# Patient Record
Sex: FEMALE | Race: WHITE | NOT HISPANIC OR LATINO | Employment: OTHER | URBAN - METROPOLITAN AREA
[De-identification: names, ages, dates, MRNs, and addresses within clinical notes are randomized per-mention and may not be internally consistent; named-entity substitution may affect disease eponyms.]

---

## 2020-01-01 ENCOUNTER — APPOINTMENT (OUTPATIENT)
Dept: PHYSICAL THERAPY | Facility: CLINIC | Age: 85
DRG: 558 | End: 2020-01-01
Attending: HOSPITALIST
Payer: COMMERCIAL

## 2020-01-01 ENCOUNTER — HOSPITAL ENCOUNTER (INPATIENT)
Facility: CLINIC | Age: 85
LOS: 3 days | Discharge: NURSING, REHAB OR LONG-TERM CARE | DRG: 558 | End: 2020-08-20
Attending: EMERGENCY MEDICINE | Admitting: HOSPITALIST
Payer: COMMERCIAL

## 2020-01-01 ENCOUNTER — APPOINTMENT (OUTPATIENT)
Dept: OCCUPATIONAL THERAPY | Facility: CLINIC | Age: 85
DRG: 558 | End: 2020-01-01
Payer: COMMERCIAL

## 2020-01-01 ENCOUNTER — APPOINTMENT (OUTPATIENT)
Dept: GENERAL RADIOLOGY | Facility: CLINIC | Age: 85
DRG: 558 | End: 2020-01-01
Attending: EMERGENCY MEDICINE
Payer: COMMERCIAL

## 2020-01-01 ENCOUNTER — APPOINTMENT (OUTPATIENT)
Dept: CT IMAGING | Facility: CLINIC | Age: 85
DRG: 558 | End: 2020-01-01
Attending: EMERGENCY MEDICINE
Payer: COMMERCIAL

## 2020-01-01 VITALS
RESPIRATION RATE: 16 BRPM | BODY MASS INDEX: 18.42 KG/M2 | DIASTOLIC BLOOD PRESSURE: 59 MMHG | OXYGEN SATURATION: 94 % | HEART RATE: 78 BPM | WEIGHT: 100.09 LBS | TEMPERATURE: 97.5 F | SYSTOLIC BLOOD PRESSURE: 142 MMHG | HEIGHT: 62 IN

## 2020-01-01 DIAGNOSIS — R41.0 CONFUSION: ICD-10-CM

## 2020-01-01 DIAGNOSIS — I25.10 CORONARY ARTERY DISEASE INVOLVING NATIVE CORONARY ARTERY OF NATIVE HEART, ANGINA PRESENCE UNSPECIFIED: Primary | ICD-10-CM

## 2020-01-01 DIAGNOSIS — M62.82 NON-TRAUMATIC RHABDOMYOLYSIS: ICD-10-CM

## 2020-01-01 LAB
ALBUMIN SERPL-MCNC: 2.6 G/DL (ref 3.4–5)
ALBUMIN SERPL-MCNC: 3.5 G/DL (ref 3.4–5)
ALBUMIN UR-MCNC: 30 MG/DL
ALP SERPL-CCNC: 65 U/L (ref 40–150)
ALP SERPL-CCNC: 97 U/L (ref 40–150)
ALT SERPL W P-5'-P-CCNC: 34 U/L (ref 0–50)
ALT SERPL W P-5'-P-CCNC: 39 U/L (ref 0–50)
ANION GAP SERPL CALCULATED.3IONS-SCNC: 5 MMOL/L (ref 3–14)
ANION GAP SERPL CALCULATED.3IONS-SCNC: 6 MMOL/L (ref 3–14)
ANION GAP SERPL CALCULATED.3IONS-SCNC: 7 MMOL/L (ref 3–14)
APPEARANCE UR: CLEAR
AST SERPL W P-5'-P-CCNC: 103 U/L (ref 0–45)
AST SERPL W P-5'-P-CCNC: 78 U/L (ref 0–45)
BACTERIA #/AREA URNS HPF: ABNORMAL /HPF
BACTERIA SPEC CULT: NO GROWTH
BASOPHILS # BLD AUTO: 0 10E9/L (ref 0–0.2)
BASOPHILS NFR BLD AUTO: 0.1 %
BILIRUB DIRECT SERPL-MCNC: 0.2 MG/DL (ref 0–0.2)
BILIRUB SERPL-MCNC: 0.6 MG/DL (ref 0.2–1.3)
BILIRUB SERPL-MCNC: 1 MG/DL (ref 0.2–1.3)
BILIRUB UR QL STRIP: NEGATIVE
BUN SERPL-MCNC: 28 MG/DL (ref 7–30)
BUN SERPL-MCNC: 28 MG/DL (ref 7–30)
BUN SERPL-MCNC: 29 MG/DL (ref 7–30)
CALCIUM SERPL-MCNC: 7.5 MG/DL (ref 8.5–10.1)
CALCIUM SERPL-MCNC: 7.9 MG/DL (ref 8.5–10.1)
CALCIUM SERPL-MCNC: 8.8 MG/DL (ref 8.5–10.1)
CHLORIDE SERPL-SCNC: 105 MMOL/L (ref 94–109)
CHLORIDE SERPL-SCNC: 108 MMOL/L (ref 94–109)
CHLORIDE SERPL-SCNC: 97 MMOL/L (ref 94–109)
CK SERPL-CCNC: 1805 U/L (ref 30–225)
CK SERPL-CCNC: 3356 U/L (ref 30–225)
CO2 SERPL-SCNC: 22 MMOL/L (ref 20–32)
CO2 SERPL-SCNC: 24 MMOL/L (ref 20–32)
CO2 SERPL-SCNC: 26 MMOL/L (ref 20–32)
COLOR UR AUTO: YELLOW
CREAT SERPL-MCNC: 0.68 MG/DL (ref 0.52–1.04)
CREAT SERPL-MCNC: 0.76 MG/DL (ref 0.52–1.04)
CREAT SERPL-MCNC: 0.81 MG/DL (ref 0.52–1.04)
DIFFERENTIAL METHOD BLD: ABNORMAL
EOSINOPHIL # BLD AUTO: 0 10E9/L (ref 0–0.7)
EOSINOPHIL NFR BLD AUTO: 0 %
ERYTHROCYTE [DISTWIDTH] IN BLOOD BY AUTOMATED COUNT: 13.6 % (ref 10–15)
ERYTHROCYTE [DISTWIDTH] IN BLOOD BY AUTOMATED COUNT: 14 % (ref 10–15)
ERYTHROCYTE [DISTWIDTH] IN BLOOD BY AUTOMATED COUNT: 14.4 % (ref 10–15)
GFR SERPL CREATININE-BSD FRML MDRD: 60 ML/MIN/{1.73_M2}
GFR SERPL CREATININE-BSD FRML MDRD: 65 ML/MIN/{1.73_M2}
GFR SERPL CREATININE-BSD FRML MDRD: 73 ML/MIN/{1.73_M2}
GLUCOSE SERPL-MCNC: 139 MG/DL (ref 70–99)
GLUCOSE SERPL-MCNC: 189 MG/DL (ref 70–99)
GLUCOSE SERPL-MCNC: 88 MG/DL (ref 70–99)
GLUCOSE UR STRIP-MCNC: NEGATIVE MG/DL
HCT VFR BLD AUTO: 28.8 % (ref 35–47)
HCT VFR BLD AUTO: 31.6 % (ref 35–47)
HCT VFR BLD AUTO: 33.8 % (ref 35–47)
HGB BLD-MCNC: 10.3 G/DL (ref 11.7–15.7)
HGB BLD-MCNC: 11.6 G/DL (ref 11.7–15.7)
HGB BLD-MCNC: 9.9 G/DL (ref 11.7–15.7)
HGB UR QL STRIP: ABNORMAL
IMM GRANULOCYTES # BLD: 0 10E9/L (ref 0–0.4)
IMM GRANULOCYTES NFR BLD: 0.2 %
INTERPRETATION ECG - MUSE: NORMAL
KETONES UR STRIP-MCNC: 10 MG/DL
LEUKOCYTE ESTERASE UR QL STRIP: ABNORMAL
LYMPHOCYTES # BLD AUTO: 1.1 10E9/L (ref 0.8–5.3)
LYMPHOCYTES NFR BLD AUTO: 6.9 %
Lab: NORMAL
MCH RBC QN AUTO: 29.2 PG (ref 26.5–33)
MCH RBC QN AUTO: 29.8 PG (ref 26.5–33)
MCH RBC QN AUTO: 30.1 PG (ref 26.5–33)
MCHC RBC AUTO-ENTMCNC: 32.6 G/DL (ref 31.5–36.5)
MCHC RBC AUTO-ENTMCNC: 34.3 G/DL (ref 31.5–36.5)
MCHC RBC AUTO-ENTMCNC: 34.4 G/DL (ref 31.5–36.5)
MCV RBC AUTO: 87 FL (ref 78–100)
MCV RBC AUTO: 88 FL (ref 78–100)
MCV RBC AUTO: 90 FL (ref 78–100)
MONOCYTES # BLD AUTO: 1.1 10E9/L (ref 0–1.3)
MONOCYTES NFR BLD AUTO: 6.9 %
NEUTROPHILS # BLD AUTO: 13.8 10E9/L (ref 1.6–8.3)
NEUTROPHILS NFR BLD AUTO: 85.9 %
NITRATE UR QL: NEGATIVE
NRBC # BLD AUTO: 0 10*3/UL
NRBC BLD AUTO-RTO: 0 /100
PH UR STRIP: 6 PH (ref 5–7)
PLATELET # BLD AUTO: 272 10E9/L (ref 150–450)
PLATELET # BLD AUTO: 328 10E9/L (ref 150–450)
PLATELET # BLD AUTO: 348 10E9/L (ref 150–450)
POTASSIUM SERPL-SCNC: 3.4 MMOL/L (ref 3.4–5.3)
POTASSIUM SERPL-SCNC: 3.9 MMOL/L (ref 3.4–5.3)
POTASSIUM SERPL-SCNC: 4 MMOL/L (ref 3.4–5.3)
PROT SERPL-MCNC: 5.5 G/DL (ref 6.8–8.8)
PROT SERPL-MCNC: 7.1 G/DL (ref 6.8–8.8)
RBC # BLD AUTO: 3.32 10E12/L (ref 3.8–5.2)
RBC # BLD AUTO: 3.53 10E12/L (ref 3.8–5.2)
RBC # BLD AUTO: 3.86 10E12/L (ref 3.8–5.2)
RBC #/AREA URNS AUTO: 2 /HPF (ref 0–2)
SARS-COV-2 RNA SPEC QL NAA+PROBE: NOT DETECTED
SODIUM SERPL-SCNC: 130 MMOL/L (ref 133–144)
SODIUM SERPL-SCNC: 134 MMOL/L (ref 133–144)
SODIUM SERPL-SCNC: 136 MMOL/L (ref 133–144)
SOURCE: ABNORMAL
SP GR UR STRIP: 1.02 (ref 1–1.03)
SPECIMEN SOURCE: NORMAL
SPECIMEN SOURCE: NORMAL
SQUAMOUS #/AREA URNS AUTO: <1 /HPF (ref 0–1)
UROBILINOGEN UR STRIP-MCNC: NORMAL MG/DL (ref 0–2)
WBC # BLD AUTO: 12.5 10E9/L (ref 4–11)
WBC # BLD AUTO: 12.6 10E9/L (ref 4–11)
WBC # BLD AUTO: 16 10E9/L (ref 4–11)
WBC #/AREA URNS AUTO: 7 /HPF (ref 0–5)

## 2020-01-01 PROCEDURE — 99223 1ST HOSP IP/OBS HIGH 75: CPT | Mod: AI | Performed by: HOSPITALIST

## 2020-01-01 PROCEDURE — 73502 X-RAY EXAM HIP UNI 2-3 VIEWS: CPT

## 2020-01-01 PROCEDURE — 36415 COLL VENOUS BLD VENIPUNCTURE: CPT | Performed by: INTERNAL MEDICINE

## 2020-01-01 PROCEDURE — 80053 COMPREHEN METABOLIC PANEL: CPT | Performed by: EMERGENCY MEDICINE

## 2020-01-01 PROCEDURE — 25000128 H RX IP 250 OP 636: Performed by: HOSPITALIST

## 2020-01-01 PROCEDURE — 82550 ASSAY OF CK (CPK): CPT | Performed by: HOSPITALIST

## 2020-01-01 PROCEDURE — 87086 URINE CULTURE/COLONY COUNT: CPT | Performed by: EMERGENCY MEDICINE

## 2020-01-01 PROCEDURE — 97116 GAIT TRAINING THERAPY: CPT | Mod: GP | Performed by: PHYSICAL THERAPIST

## 2020-01-01 PROCEDURE — 80048 BASIC METABOLIC PNL TOTAL CA: CPT | Performed by: HOSPITALIST

## 2020-01-01 PROCEDURE — U0003 INFECTIOUS AGENT DETECTION BY NUCLEIC ACID (DNA OR RNA); SEVERE ACUTE RESPIRATORY SYNDROME CORONAVIRUS 2 (SARS-COV-2) (CORONAVIRUS DISEASE [COVID-19]), AMPLIFIED PROBE TECHNIQUE, MAKING USE OF HIGH THROUGHPUT TECHNOLOGIES AS DESCRIBED BY CMS-2020-01-R: HCPCS | Performed by: EMERGENCY MEDICINE

## 2020-01-01 PROCEDURE — 25000128 H RX IP 250 OP 636: Performed by: EMERGENCY MEDICINE

## 2020-01-01 PROCEDURE — 96361 HYDRATE IV INFUSION ADD-ON: CPT

## 2020-01-01 PROCEDURE — 70450 CT HEAD/BRAIN W/O DYE: CPT

## 2020-01-01 PROCEDURE — 25000132 ZZH RX MED GY IP 250 OP 250 PS 637: Performed by: INTERNAL MEDICINE

## 2020-01-01 PROCEDURE — 99232 SBSQ HOSP IP/OBS MODERATE 35: CPT | Performed by: INTERNAL MEDICINE

## 2020-01-01 PROCEDURE — 25000128 H RX IP 250 OP 636: Performed by: INTERNAL MEDICINE

## 2020-01-01 PROCEDURE — 71045 X-RAY EXAM CHEST 1 VIEW: CPT

## 2020-01-01 PROCEDURE — 97530 THERAPEUTIC ACTIVITIES: CPT | Mod: GP | Performed by: PHYSICAL THERAPIST

## 2020-01-01 PROCEDURE — 85027 COMPLETE CBC AUTOMATED: CPT | Performed by: INTERNAL MEDICINE

## 2020-01-01 PROCEDURE — 12000000 ZZH R&B MED SURG/OB

## 2020-01-01 PROCEDURE — G0463 HOSPITAL OUTPT CLINIC VISIT: HCPCS

## 2020-01-01 PROCEDURE — 25800030 ZZH RX IP 258 OP 636: Performed by: INTERNAL MEDICINE

## 2020-01-01 PROCEDURE — C9803 HOPD COVID-19 SPEC COLLECT: HCPCS

## 2020-01-01 PROCEDURE — 85027 COMPLETE CBC AUTOMATED: CPT | Performed by: HOSPITALIST

## 2020-01-01 PROCEDURE — 97530 THERAPEUTIC ACTIVITIES: CPT | Mod: GO

## 2020-01-01 PROCEDURE — 25800030 ZZH RX IP 258 OP 636: Performed by: HOSPITALIST

## 2020-01-01 PROCEDURE — 25000132 ZZH RX MED GY IP 250 OP 250 PS 637: Performed by: HOSPITALIST

## 2020-01-01 PROCEDURE — 80048 BASIC METABOLIC PNL TOTAL CA: CPT | Performed by: INTERNAL MEDICINE

## 2020-01-01 PROCEDURE — 96365 THER/PROPH/DIAG IV INF INIT: CPT

## 2020-01-01 PROCEDURE — 97161 PT EVAL LOW COMPLEX 20 MIN: CPT | Mod: GP | Performed by: PHYSICAL THERAPIST

## 2020-01-01 PROCEDURE — 80076 HEPATIC FUNCTION PANEL: CPT | Performed by: HOSPITALIST

## 2020-01-01 PROCEDURE — 99291 CRITICAL CARE FIRST HOUR: CPT | Mod: 25

## 2020-01-01 PROCEDURE — 99239 HOSP IP/OBS DSCHRG MGMT >30: CPT | Performed by: INTERNAL MEDICINE

## 2020-01-01 PROCEDURE — 36415 COLL VENOUS BLD VENIPUNCTURE: CPT | Performed by: HOSPITALIST

## 2020-01-01 PROCEDURE — 81001 URINALYSIS AUTO W/SCOPE: CPT | Performed by: EMERGENCY MEDICINE

## 2020-01-01 PROCEDURE — 82550 ASSAY OF CK (CPK): CPT | Performed by: EMERGENCY MEDICINE

## 2020-01-01 PROCEDURE — 97165 OT EVAL LOW COMPLEX 30 MIN: CPT | Mod: GO

## 2020-01-01 PROCEDURE — 93005 ELECTROCARDIOGRAM TRACING: CPT

## 2020-01-01 PROCEDURE — 25800030 ZZH RX IP 258 OP 636: Performed by: EMERGENCY MEDICINE

## 2020-01-01 PROCEDURE — 85025 COMPLETE CBC W/AUTO DIFF WBC: CPT | Performed by: EMERGENCY MEDICINE

## 2020-01-01 RX ORDER — CEFTRIAXONE 1 G/1
1 INJECTION, POWDER, FOR SOLUTION INTRAMUSCULAR; INTRAVENOUS EVERY 24 HOURS
Status: DISCONTINUED | OUTPATIENT
Start: 2020-01-01 | End: 2020-01-01

## 2020-01-01 RX ORDER — METOPROLOL TARTRATE 25 MG/1
25 TABLET, FILM COATED ORAL 2 TIMES DAILY
Qty: 60 TABLET | Refills: 0 | DISCHARGE
Start: 2020-01-01

## 2020-01-01 RX ORDER — NITROGLYCERIN 0.4 MG/1
TABLET SUBLINGUAL
Qty: 20 TABLET | Refills: 0 | DISCHARGE
Start: 2020-01-01

## 2020-01-01 RX ORDER — METOPROLOL TARTRATE 25 MG/1
25 TABLET, FILM COATED ORAL 2 TIMES DAILY
Status: DISCONTINUED | OUTPATIENT
Start: 2020-01-01 | End: 2020-01-01 | Stop reason: HOSPADM

## 2020-01-01 RX ORDER — FERROUS SULFATE 325(65) MG
325 TABLET ORAL 2 TIMES DAILY WITH MEALS
Status: DISCONTINUED | OUTPATIENT
Start: 2020-01-01 | End: 2020-01-01 | Stop reason: HOSPADM

## 2020-01-01 RX ORDER — ACETAMINOPHEN 325 MG/1
650 TABLET ORAL EVERY 4 HOURS PRN
Status: DISCONTINUED | OUTPATIENT
Start: 2020-01-01 | End: 2020-01-01 | Stop reason: HOSPADM

## 2020-01-01 RX ORDER — SODIUM CHLORIDE 9 MG/ML
INJECTION, SOLUTION INTRAVENOUS CONTINUOUS
Status: DISCONTINUED | OUTPATIENT
Start: 2020-01-01 | End: 2020-01-01

## 2020-01-01 RX ORDER — HYDRALAZINE HYDROCHLORIDE 20 MG/ML
10 INJECTION INTRAMUSCULAR; INTRAVENOUS EVERY 4 HOURS PRN
Status: DISCONTINUED | OUTPATIENT
Start: 2020-01-01 | End: 2020-01-01 | Stop reason: HOSPADM

## 2020-01-01 RX ORDER — NALOXONE HYDROCHLORIDE 0.4 MG/ML
.1-.4 INJECTION, SOLUTION INTRAMUSCULAR; INTRAVENOUS; SUBCUTANEOUS
Status: DISCONTINUED | OUTPATIENT
Start: 2020-01-01 | End: 2020-01-01 | Stop reason: HOSPADM

## 2020-01-01 RX ORDER — FERROUS SULFATE 325(65) MG
325 TABLET ORAL 2 TIMES DAILY WITH MEALS
Qty: 60 TABLET | Refills: 0 | DISCHARGE
Start: 2020-01-01

## 2020-01-01 RX ORDER — NITROGLYCERIN 0.4 MG/1
0.4 TABLET SUBLINGUAL EVERY 5 MIN PRN
Status: DISCONTINUED | OUTPATIENT
Start: 2020-01-01 | End: 2020-01-01 | Stop reason: HOSPADM

## 2020-01-01 RX ORDER — CEFTRIAXONE 1 G/1
1 INJECTION, POWDER, FOR SOLUTION INTRAMUSCULAR; INTRAVENOUS ONCE
Status: COMPLETED | OUTPATIENT
Start: 2020-01-01 | End: 2020-01-01

## 2020-01-01 RX ORDER — ASPIRIN 81 MG/1
81 TABLET ORAL DAILY
Status: DISCONTINUED | OUTPATIENT
Start: 2020-01-01 | End: 2020-01-01 | Stop reason: HOSPADM

## 2020-01-01 RX ORDER — AMOXICILLIN 250 MG
1 CAPSULE ORAL 2 TIMES DAILY PRN
Status: DISCONTINUED | OUTPATIENT
Start: 2020-01-01 | End: 2020-01-01 | Stop reason: HOSPADM

## 2020-01-01 RX ORDER — LIDOCAINE 40 MG/G
CREAM TOPICAL
Status: DISCONTINUED | OUTPATIENT
Start: 2020-01-01 | End: 2020-01-01 | Stop reason: HOSPADM

## 2020-01-01 RX ORDER — FUROSEMIDE 10 MG/ML
20 INJECTION INTRAMUSCULAR; INTRAVENOUS ONCE
Status: COMPLETED | OUTPATIENT
Start: 2020-01-01 | End: 2020-01-01

## 2020-01-01 RX ORDER — ATORVASTATIN CALCIUM 20 MG/1
20 TABLET, FILM COATED ORAL EVERY EVENING
Qty: 20 TABLET | Refills: 0 | DISCHARGE
Start: 2020-01-01

## 2020-01-01 RX ORDER — LISINOPRIL 2.5 MG/1
2.5 TABLET ORAL DAILY
Qty: 30 TABLET | Refills: 0 | DISCHARGE
Start: 2020-01-01

## 2020-01-01 RX ORDER — LISINOPRIL 2.5 MG/1
2.5 TABLET ORAL DAILY
Status: DISCONTINUED | OUTPATIENT
Start: 2020-01-01 | End: 2020-01-01 | Stop reason: HOSPADM

## 2020-01-01 RX ORDER — ATORVASTATIN CALCIUM 10 MG/1
20 TABLET, FILM COATED ORAL EVERY EVENING
Status: DISCONTINUED | OUTPATIENT
Start: 2020-01-01 | End: 2020-01-01 | Stop reason: HOSPADM

## 2020-01-01 RX ORDER — AMOXICILLIN 250 MG
2 CAPSULE ORAL 2 TIMES DAILY PRN
Status: DISCONTINUED | OUTPATIENT
Start: 2020-01-01 | End: 2020-01-01 | Stop reason: HOSPADM

## 2020-01-01 RX ORDER — POLYETHYLENE GLYCOL 3350 17 G/17G
17 POWDER, FOR SOLUTION ORAL DAILY PRN
Status: DISCONTINUED | OUTPATIENT
Start: 2020-01-01 | End: 2020-01-01 | Stop reason: HOSPADM

## 2020-01-01 RX ADMIN — SODIUM CHLORIDE, PRESERVATIVE FREE: 5 INJECTION INTRAVENOUS at 08:27

## 2020-01-01 RX ADMIN — HYDRALAZINE HYDROCHLORIDE 10 MG: 20 INJECTION INTRAMUSCULAR; INTRAVENOUS at 05:38

## 2020-01-01 RX ADMIN — METOPROLOL TARTRATE 25 MG: 25 TABLET ORAL at 13:39

## 2020-01-01 RX ADMIN — CEFTRIAXONE SODIUM 1 G: 1 INJECTION, POWDER, FOR SOLUTION INTRAMUSCULAR; INTRAVENOUS at 21:05

## 2020-01-01 RX ADMIN — HYDRALAZINE HYDROCHLORIDE 10 MG: 20 INJECTION INTRAMUSCULAR; INTRAVENOUS at 22:39

## 2020-01-01 RX ADMIN — SODIUM CHLORIDE 1000 ML: 9 INJECTION, SOLUTION INTRAVENOUS at 19:44

## 2020-01-01 RX ADMIN — ASPIRIN 81 MG: 81 TABLET, COATED ORAL at 13:39

## 2020-01-01 RX ADMIN — FERROUS SULFATE TAB 325 MG (65 MG ELEMENTAL FE) 325 MG: 325 (65 FE) TAB at 13:40

## 2020-01-01 RX ADMIN — FUROSEMIDE 20 MG: 10 INJECTION, SOLUTION INTRAVENOUS at 12:53

## 2020-01-01 RX ADMIN — SODIUM CHLORIDE, PRESERVATIVE FREE: 5 INJECTION INTRAVENOUS at 22:38

## 2020-01-01 RX ADMIN — Medication 1 MG: at 22:56

## 2020-01-01 RX ADMIN — SODIUM CHLORIDE, PRESERVATIVE FREE: 5 INJECTION INTRAVENOUS at 23:49

## 2020-01-01 ASSESSMENT — MIFFLIN-ST. JEOR
SCORE: 788.6
SCORE: 774.6
SCORE: 793.6
SCORE: 809.6

## 2020-01-01 ASSESSMENT — ACTIVITIES OF DAILY LIVING (ADL)
ADLS_ACUITY_SCORE: 24
RETIRED_COMMUNICATION: 0-->UNDERSTANDS/COMMUNICATES WITHOUT DIFFICULTY
ADLS_ACUITY_SCORE: 25
ADLS_ACUITY_SCORE: 25
ADLS_ACUITY_SCORE: 23
ADLS_ACUITY_SCORE: 23
COGNITION: 1 - ATTENTION OR MEMORY DEFICITS
ADLS_ACUITY_SCORE: 21
ADLS_ACUITY_SCORE: 21
ADLS_ACUITY_SCORE: 23
ADLS_ACUITY_SCORE: 24
ADLS_ACUITY_SCORE: 23
ADLS_ACUITY_SCORE: 17
PREVIOUS_RESPONSIBILITIES: MEAL PREP;HOUSEKEEPING;LAUNDRY
SWALLOWING: 0-->SWALLOWS FOODS/LIQUIDS WITHOUT DIFFICULTY
ADLS_ACUITY_SCORE: 17
ADLS_ACUITY_SCORE: 25
ADLS_ACUITY_SCORE: 25
ADLS_ACUITY_SCORE: 23
ADLS_ACUITY_SCORE: 25
WHICH_OF_THE_ABOVE_FUNCTIONAL_RISKS_HAD_A_RECENT_ONSET_OR_CHANGE?: FALL HISTORY

## 2020-01-01 ASSESSMENT — ENCOUNTER SYMPTOMS
ARTHRALGIAS: 0
MYALGIAS: 0
WOUND: 1
COLOR CHANGE: 1
NECK PAIN: 0
ABDOMINAL PAIN: 0
HEADACHES: 0
BACK PAIN: 0

## 2020-08-17 PROBLEM — M62.82 RHABDOMYOLYSIS: Status: ACTIVE | Noted: 2020-01-01

## 2020-08-17 NOTE — ED PROVIDER NOTES
History   Chief Complaint:  Fall and Hypertension     HPI   Belen Paredes is a 89 year old female with history of NSTEMI, CHF, memory loss, hypertension, and hyperlipidemia who presents for evaluation after an unwitnessed fall. EMS reports, the patient lives alone in an apartment. They report a presumed unwitnessed fall and she was crawling around the apartment trying to find her phone to call for help. Her son had attempted to call her, but she did not answer so he called the building and maintenance found her on the ground, prompting EMS presentation. On EMS arrival, she was hypertensive and found on the ground with feces around the apartment. They also noted dried blood on the top of her head and right forearm bruising.     En route, the patient's blood sugar was 139 and they placed an 18 G IV in her right AC.     Here, the patient states she does not remember falling. She denies any headache, abdominal pain, neck pain, back pain, or other symptoms prompting her presentation.     Allergies:  No Known Drug Allergies     Medications:   Aspirin  Nitrostat  Lipitor  Lopressor    Past Medical History:    Hyperlipidemia  Chronic systolic heart failure  Anemia  Coronary atherosclerosis   Memory loss  NSTEMI  Hypertension  Congestive heart failure     Past Surgical History:    Total abdominal hysterectomy and bilateral salping-oophorectomy   Lobectomy of lung    Family History:    The patient denies any relevant family medical history.     Social History:  The patient was accompanied to the ED by EMS.  Smoking Status: Never Smoker  Smokeless Tobacco: Never Used  Alcohol Use: No  Drug Use: No    Review of Systems   Gastrointestinal: Negative for abdominal pain.   Musculoskeletal: Negative for arthralgias, back pain, myalgias and neck pain.   Skin: Positive for color change and wound.   Neurological: Negative for headaches.   All other systems reviewed and are negative.      Physical Exam     Patient Vitals for the  past 24 hrs:   BP Temp Temp src Pulse Heart Rate Resp SpO2   08/17/20 1945 (!) 164/64 97.6  F (36.4  C) Oral 84 86 10 97 %   08/17/20 1854 (!) 199/85 -- -- 67 67 16 94 %     Physical Exam  Constitutional: Alert, oriented to self and place. Pleasantly confused.   HENT:    Nose: Nose normal.    Mouth/Throat: Oropharynx is clear, mucous membranes are dry  Eyes: EOM are normal, anicteric, conjugate gaze  CV: regular rate and rhythm; no murmurs  Chest: Effort normal and breath sounds clear without wheezing or rales, symmetric bilaterally   GI:  non tender. No distension. No guarding or rebound.    MSK: No LE edema, no tenderness to palpation of BLE.  Neurological: Alert, Pleasantly confused, moving all extremities equally. No pronator drift. Cranial nerves intact.  Skin: Skin is warm and dry. Ecchymosis to right arm. Grade 2 pressure sore, right lateral hip. Grade 1 sacral pressure sore.   Emergency Department Course     ECG:  ECG taken at 1902, ECG read at 1910 by Neil Sandoval MD  Sinus tachycardia  Left ventricular hypertrophy with repolarization abnormality  Abnormal ECG  Rate 101 bpm. NJ interval 142. QRS duration 78. QT/QTc 346/448. P-R-T axes 61 81 54.      Imaging:  Radiology findings were communicated with the patient, family and Admitting MD who voiced understanding of the findings.    Head CT w/o Contrast  IMPRESSION:  1. Moderate cerebral atrophy.  2. Moderately extensive white matter changes which are most likely due  to chronic small vessel ischemic disease.  3. No evidence for intracranial hemorrhage or any acute process.  Reading per radiology.     XR Chest Port 1 View  IMPRESSION: No airspace consolidation, pneumothorax, or pleural  effusion. The aorta is elongated and calcified. Severe degenerative  changes of the left shoulder. Left shoulder is not well profiled and a  subcapital fracture cannot be excluded. Consider dedicated shoulder  films if there is shoulder pain.  Reading per radiology.      XR Pelvis and Hip Right 2 Views  IMPRESSION: No fracture or dislocation. Mild degenerative changes in both hips.  Reading per radiology.     Laboratory:  Laboratory findings were communicated with the patient, family and Admitting MD who voiced understanding of the findings.    CBC: WBC 16.0 (H), HGB 11.6 (L) o/w WNL ()  CMP:  (L), Glucose 139 (H), Ast 103 (H) o/w WNL (Creatinine 0.76)   CK total: 3356 (HH)    UA with Microscopic: Ketones 10 (A), Blood Trace (A), Protein albumin 30 (A), Leukocyte esterase Moderate (A), WBC/HPF 7 (H), Bacteria Few (A) o/w WNL   Urine Culture: Pending.     COVID-19 Virus (Coronavirus) by PCR: Pending.    Interventions:  1944 0.9% NaCl bolus 1000 mL IV   2105 Rocephin 1 g IV    Emergency Department Course:  Past medical records, nursing notes, and vitals reviewed.  EKG obtained in the ED, see results above.    The patient was sent for a Head CT w/o Contrast, XR Chest Port 1 View, XR Pelvis and Hip Right 2 Views while in the emergency department, results above.    IV was inserted and blood was drawn for laboratory testing, results above.   The patient provided a urine sample here in the emergency department. This was sent for laboratory testing, findings above.   A nasal swab was obtained for laboratory testing, findings above.      (1842)   I arrived in the patient's room in anticipation of the patient.     (1844)   EMS arrived with the patient and provided history.     (1846)   I performed an exam of the patient as documented above.     (2006)   I spoke with the patient's son, Guido, regarding his mother's presentation, findings, and plan of care.     (2036)   I spoke with Dr. Amato of the Hospitalist service regarding patient's presentation, findings, and plan of care.     Findings and plan explained to the Patient and son who consents to admission. Discussed the patient with Dr. Amato, who will admit the patient to a medicine bed for further monitoring, evaluation,  and treatment.  I personally reviewed the laboratory and imaging results with the Patient and son and answered all related questions prior to admission.     Impression & Plan   Covid-19  Belen Paredes was evaluated during a global COVID-19 pandemic, which necessitated consideration that the patient might be at risk for infection with the SARS-CoV-2 virus that causes COVID-19.   Applicable protocols for evaluation were followed during the patient's care.   COVID-19 was considered as part of the patient's evaluation. The plan for testing is:  a test was obtained during this visit.     Medical Decision Making:  Belen Paredes is a 89 year old female presenting for evaluation after she was found down in her condo after son did not hear from her and a  found her there. EMS reported scattered feces throughout the floor, suggesting she was moving around, but she did have pressure sores on her right buttock and bruising over her right forearm, however no focal bony tenderness. She is pleasantly confused with no focalizing neurologic deficits, CT imaging of her head was negative. Chest xray is clear. Right hip/pelvis xrays is negative. Labs were notable for mild leukocytosis, though I suspect this is likely secondary to stress demarginalization however UA is somewhat suggestive of a UTI, culture sent and will give a dose of ceftriaxone. CK was elevated at 33,000, however her creatinine is within normal limits. She was given 1 L IV fluids here, however further aggressive hydration was deferred given remote history of heart failure. I did discuss her presentation with her son, who is worried her memory has been declining gradually and there certainly could be a component of dementia. Will admit  to medicine for continued IV hydration for rhabdomyolysis, generalized weakness, and inability to care for herself.     Critical Care Time was 30 minutes for this patient excluding procedures.    Diagnosis:     ICD-10-CM    1. Non-traumatic rhabdomyolysis  M62.82    2. Confusion  R41.0      Disposition:  Admitted to a medicine bed under the care of Dr. Amato.     Neil Sandoval MD  [employer]  11:11 PM 08/17/20     Scribe Disclosure:  I, Lino Barry, am serving as a scribe at 6:48 PM on 8/17/2020 to document services personally performed by Neil Sandoval MD based on my observations and the provider's statements to me.  August 17, 2020   [facility] EMERGENCY DEPARTMENT       Neil Sandoval MD  08/17/20 8238

## 2020-08-17 NOTE — ED TRIAGE NOTES
Pt was found on the ground of her apartment with feces all over. Pt had an apparent fall at some point, son last heard from her more than a day ago. Pt has a sore on the left buttocks, dried blood on the head/neck.

## 2020-08-17 NOTE — ED NOTES
Bed: ST02  Expected date:   Expected time:   Means of arrival:   Comments:  German Reece found down hypotension 89 f

## 2020-08-18 NOTE — ED NOTES
[facility]  ED Nurse Handoff Report    ED Chief complaint: Fall and Hypertension      ED Diagnosis:   Final diagnoses:   None       Code Status: Full Code    Allergies: No Known Allergies    Patient Story: unknown fall  Focused Assessment:  Pt had unknown fall at unknown time. Son called maintenance to check on pt, pt was on the floor covered in feces. Pt seems confused, unsure if baseline.     Treatments and/or interventions provided: IVF  Patient's response to treatments and/or interventions: good    To be done/followed up on inpatient unit:  monitor    Does this patient have any cognitive concerns?: Short term memory loss    Activity level - Baseline/Home:  Independent  Activity Level - Current:   Stand with assist x2    Patient's Preferred language: Data Unavailable   Needed?: No    Isolation: None  Infection: Not Applicable  Bariatric?: No    Vital Signs:   Vitals:    08/17/20 1854 08/17/20 1945   BP: (!) 199/85    Pulse: 67    Resp: 16    Temp:  97.6  F (36.4  C)   TempSrc:  Oral   SpO2: 94%        Cardiac Rhythm:     Was the PSS-3 completed:   Yes  What interventions are required if any?               Family Comments: none here  OBS brochure/video discussed/provided to patient/family: N/A              Name of person given brochure if not patient: NA              Relationship to patient: NA    For the majority of the shift this patient's behavior was Green.   Behavioral interventions performed were none.    ED NURSE PHONE NUMBER: *[phone]

## 2020-08-18 NOTE — PLAN OF CARE
Alert and oriented x1 (self). Vital signs stable on room air. Assist of 2 + gait belt and walker; turn and repo q2h. Regular diet. Lung sounds diminished. Bowel sounds active, + flatus, BM today (found covered in stool); incontinent of stool. Burk patent with adequate urine output. Abrasion to left posterior head - scant serosanguinous drainage. Denies pain and nausea.

## 2020-08-18 NOTE — PLAN OF CARE
Discharge Planner PT   Patient plan for discharge: Home  Current status: Evaluation completed, treatment initiated. 89 yo female found down in condo. Found to be with onset of rhabdomyelitis and hyponatremia. Pt at baseline is [place], wears hearing aids. Uses 4[employer] for longer distances. Independent in her condo with mobility and basic ADLs. Son provides groceries and transportation for appointments.  Pt presents confused, adamant that collusion is occurring amongst staff. Follows 75% of one-step commands for mobility with constant redirection. Pt is impulsive. Sup>sit Astrid, sit>stand Astrid. Ambulated 150' with 4WW Astrid for balance and navigation. Up in chair, sitter and son at bedside upon PT exit.  Barriers to return to prior living situation: Decreased balance, generalized weakness, confusion, decreased command following, impaired safety awareness, fall risk.  Recommendations for discharge: TCU  Rationale for recommendations: Pt will benefit from continued skilled PT to improve independence and safety with functional mobility.       Entered by: Sakshi An 08/18/2020 3:11 PM

## 2020-08-18 NOTE — PLAN OF CARE
DATE & TIME: 8/17 from 2130 to 0730    Cognitive Concerns/ Orientation : Alert to self only. [place], other wise confused.   BEHAVIOR & AGGRESSION TOOL COLOR: Green, confused  CIWA SCORE: N/A   ABNL VS/O2: VSS on RA except hypertensive 180/77, given PRN Hydralazine  Latest B/P 156/68  MOBILITY: Assist, x 2.did not get out of bed, PT/OT consult pending.  PAIN MANAGMENT: Denied  DIET: Regular  BOWEL/BLADDER: did not void, bladder scanned with reading of 160 ml.   ABNL LAB/BG: Na 130, CK total 3,356, , , WBC 16, Hgb 11.6  DRAIN/DEVICES: NS infusing at 100 mL/hr  TELEMETRY RHYTHM: N/A  SKIN: Abrasion on the top of head, MD is aware. Right forearm bruising, Blanchable redness ox coccyx area, Meplex dressing applied. Pressure sore on right lateral of right hip.    TESTS/PROCEDURES: CT scan of head done  D/C DAY/GOALS/PLACE: Expected discharge 2-3 days pending in progress for clinical improvement  OTHER IMPORTANT INFO: PT/OT and WOC consult pending.   MD/RN ROUNDING SIGNED OFF D/E SHIFT: N/A  COMMIT TO SIT DONE AND SIGNED OFF Yes

## 2020-08-18 NOTE — PROGRESS NOTES
RECEIVING UNIT ED HANDOFF REVIEW    ED Nurse Handoff Report was reviewed by: Leander Asif RN on August 17, 2020 at 9:10 PM

## 2020-08-18 NOTE — PROGRESS NOTES
[facility]    Hospitalist Progress Note    Date of Service (when I saw the patient): 08/18/2020    Assessment & Plan   Belen Paredes is a 89 year old female who was admitted on 8/17/2020.  Assessment & Plan     Belen Paredes is a 89 year old female who presents with fall, found to have rhabdomyolysis and abnormal UA     Rhabdomyolysis  Fall, unwitnessed  Prolonged time on the ground unable to get up. CK 3356. Cr 0.76. AST mild elevated at 103. Na 130.  - IVF @100ml/hr--monitor respiratory status given hx chf  - CPK improved with hydration to 1805 today  Patient has not voided yet due to urinary retention bladder scan has 600 ml.  Burk placed today due to retention  - PT/OT/Care transitions consult--unlikely safe to go home  Hyponatremia  Na 130. Likely secondary to decreased intake since has been down on the ground  Sodium improved to 134 today with IV fluid hydration     Abnormal UA  UA with moderate leuk esterase, 7 WBCs. Empirically given ceftriaxone in ED.  - Follow up urine culture  Urine culture is negative so we will stop IV ceftriaxone today     Leukocytosis  Most likely due to dehydration WBC count is improving with hydration from 16 K yesterday to 12.6 today  Anemia  Hx of. Hgb 11.6 on admit, no signs of bleeding. Hx hgb 7-9 range in 2014.  Hemoglobin at 9.9 today continue to monitor closely     Hx Systolic CHF  TTE 9/2014 wth EF 30-35% with grade 1 diastolic dysfunction.  - Monitor fluid status carefully with ongoing IVF as above for rhabdo.  - daily weights  - I/Os     Right lateral hip pressure sore, present on admission  - WOC consulted     Hypertension  Known history high BP in the past, looks like she has been on lisinopril 5mg ~4 years ago, unsure if she is still on this medication. Likely that she missed doses if she was. BPs 170s-190s on admit  - await med rec  - hydralazine PRN for SBP>170  Blood pressure in the 150s today     Hyperlipidemia  Has been on statin in the  past  - Await med rec     Hard of hearing  Normally wears hearing aides, but does not have them  - Please have pocket talker available     Asymptomatic COVID-19 screening  Found down at home, no suspicious symptoms, but unreliable.  - Low suspicion. Add precautions if positive     DVT Prophylaxis: Pneumatic Compression Devices  Code Status: DNR/DNI ordered after discussion with son     Disposition: Expected discharge in 1-2days when stable . CPK normalizes     Paige Torres MD  [phone] (P)      Interval History   Resting comfortably in bed.  Denies any pain.  Does not want to be bothered much.      -Data reviewed today: I reviewed all new labs and imaging results over the last 24 hours. I personally reviewed no images or EKG's today.    Physical Exam   Temp: (P) 99.8  F (37.7  C) Temp src: (P) Axillary BP: (P) 125/65 Pulse: (P) 79 Heart Rate: 72 Resp: 16 SpO2: (P) 96 % O2 Device: (P) None (Room air)    Vitals:    08/17/20 2218 08/18/20 0428   Weight: 44.9 kg (98 lb 15.8 oz) 43.5 kg (95 lb 14.4 oz)     Vital Signs with Ranges  Temp:  [97.6  F (36.4  C)-99.8  F (37.7  C)] (P) 99.8  F (37.7  C)  Pulse:  [67-84] (P) 79  Heart Rate:  [67-86] 72  Resp:  [10-16] 16  BP: (156-199)/(64-85) (P) 125/65  SpO2:  [94 %-98 %] (P) 96 %  No intake/output data recorded.    Constitutional: Awake, alert, cooperative, no apparent distress, elderly female lying comfortably in bed and resting  Respiratory: Clear to auscultation bilaterally, no crackles or wheezing  Cardiovascular: Regular rate and rhythm, normal S1 and S2, and no murmur noted  GI: Normal bowel sounds, soft, non-distended, non-tender  Skin/Integumen: No rashes, no cyanosis, no edema  Other:     Medications     sodium chloride 100 mL/hr at 08/18/20 0827       cefTRIAXone  1 g Intravenous Q24H     sodium chloride (PF)  3 mL Intracatheter Q8H       Data   Recent Labs   Lab 08/18/20  0906 08/17/20  1858   WBC 12.6* 16.0*   HGB 9.9* 11.6*   MCV 87 88    348     130*   POTASSIUM 3.9 4.0   CHLORIDE 105 97   CO2 24 26   BUN 29 28   CR 0.68 0.76   ANIONGAP 5 7   JENNY 7.5* 8.8   GLC 88 139*   ALBUMIN 2.6* 3.5   PROTTOTAL 5.5* 7.1   BILITOTAL 0.6 1.0   ALKPHOS 65 97   ALT 34 39   AST 78* 103*       Recent Results (from the past 24 hour(s))   Head CT w/o contrast    Narrative    CT SCAN OF THE HEAD WITHOUT CONTRAST   8/17/2020 7:19 PM     HISTORY: Altered mental status, unexplained. Fall.    TECHNIQUE:  Axial images of the head and coronal reformations without  IV contrast material.  Radiation dose for this scan was reduced using  automated exposure control, adjustment of the mA and/or kV according  to patient size, or iterative reconstruction technique.    COMPARISON: None.    FINDINGS: Moderate cerebral atrophy is present. Moderately extensive  white matter changes are seen in both hemispheres without mass effect.  There is no evidence for intracranial hemorrhage, mass effect, acute  infarct, or skull fracture. Visualized paranasal sinuses and mastoid  air cells are clear.      Impression    IMPRESSION:  1. Moderate cerebral atrophy.  2. Moderately extensive white matter changes which are most likely due  to chronic small vessel ischemic disease.  3. No evidence for intracranial hemorrhage or any acute process.      ALLEY RIBERA MD   XR Chest 1 View    Narrative    CHEST ONE VIEW  8/17/2020 7:35 PM     HISTORY: Fall    COMPARISON: None.      Impression    IMPRESSION: No airspace consolidation, pneumothorax, or pleural  effusion. The aorta is elongated and calcified. Severe degenerative  changes of the left shoulder. Left shoulder is not well profiled and a  subcapital fracture cannot be excluded. Consider dedicated shoulder  films if there is shoulder pain.    KEY HAWKINS MD   XR Pelvis w Hip Right 1 View    Narrative    EXAM: XR PELVIS AND HIP RIGHT 1 VIEW  LOCATION: [facility]  DATE/TIME: 8/17/2020 7:17 PM    INDICATION: Right hip pain. Possible  fall.  COMPARISON: None.      Impression    IMPRESSION: No fracture or dislocation. Mild degenerative changes in both hips.

## 2020-08-18 NOTE — PROGRESS NOTES
08/18/20 1434   Quick Adds   Type of Visit Initial PT Evaluation   Living Environment   Lives With alone   Living Arrangements condominium   Home Accessibility no concerns   Transportation Anticipated family or friend will provide   Living Environment Comment Son brings groceries. Son reports he checks in daily with phone call.   Self-Care   Usual Activity Tolerance fair   Current Activity Tolerance fair   Activity/Exercise/Self-Care Comment Per son, pt uses 4WW for longer distances. Nothing in condo.   Functional Level Prior   Ambulation 1-->assistive equipment   Transferring 0-->independent   Toileting 0-->independent   Fall history within last six months yes   Number of times patient has fallen within last six months 1   Which of the above functional risks had a recent onset or change? cognition;fall history  (Per son, pt's cognition is not currently baseline.)   Prior Functional Level Comment Pt is not good historian. Son present to answer some questions. Also engaged with nursing on pt's medical status.   General Information   Onset of Illness/Injury or Date of Surgery - Date 08/17/20   Referring Physician Racheal Amato, DO    Pertinent History of Current Problem (include personal factors and/or comorbidities that impact the POC) 89 yo female found down in her condo. Onset of rhabdomyolitis and hyponatremia. Pt [place] at baseline and has HTN.   Precautions/Limitations fall precautions   General Observations Sitter at bedside. Pt with nonsensical conversation. Wearing a pocket talker.   General Info Comments Son brought hearing aids. They are charging at bedside.   Cognitive Status Examination   Orientation person   Level of Consciousness alert;confused;agitated   Follows Commands and Answers Questions 75% of the time;able to follow single-step instructions  (with redirection)   Personal Safety and Judgment impulsive   Cognitive Comment Son reported pt not at baseline. Pt thinks people are conspiring  "against her. Provided reassurance to the pt throughout.   Integumentary/Edema   Integumentary/Edema Comments Pt with significant bruising B arms. Small laceration on back of head.   Posture    Posture Forward head position;Protracted shoulders;Kyphosis   Range of Motion (ROM)   ROM Comment B UEs and LEs within functional limits   Strength   Strength Comments Demonstrates antigravity strength.    Bed Mobility   Bed Mobility Comments Supine>sit CGA   Transfer Skills   Transfer Comments Sit>stand Astrid   Gait   Gait Comments Bedside gait Astrid for balance. Pt reaches for furniture.   Balance   Balance Comments Posterior lean in sitting. Sitting balance fair. Standing balance fair. Improved with UE support.   Muscle Tone   Muscle Tone Comments Pt is thin and frail in appearance. Muscle atrophy likely disuse and age related.   General Therapy Interventions   Planned Therapy Interventions balance training;bed mobility training;gait training;neuromuscular re-education;ROM;strengthening;stretching;transfer training;progressive activity/exercise   Clinical Impression   Criteria for Skilled Therapeutic Intervention yes, treatment indicated   PT Diagnosis Impaired gait   Influenced by the following impairments Impaired balance, generalized weakness, confusion, impulsivity, lacks insight to deficits, fall risk   Functional limitations due to impairments Decreased functional independence   Clinical Presentation Stable/Uncomplicated   Clinical Presentation Rationale See MR. Clinical judgement used.   Clinical Decision Making (Complexity) Low complexity   Therapy Frequency 5x/week   Predicted Duration of Therapy Intervention (days/wks) 1 week   Anticipated Discharge Disposition Transitional Care Facility   Risk & Benefits of therapy have been explained Yes   Patient, Family & other staff in agreement with plan of care Yes   [facility] AM-PAC  \"6 Clicks\" V.2 Basic Mobility Inpatient Short Form   1. Turning from your back to " your side while in a flat bed without using bedrails? 3 - A Little   2. Moving from lying on your back to sitting on the side of a flat bed without using bedrails? 3 - A Little   3. Moving to and from a bed to a chair (including a wheelchair)? 3 - A Little   4. Standing up from a chair using your arms (e.g., wheelchair, or bedside chair)? 3 - A Little   5. To walk in hospital room? 3 - A Little   6. Climbing 3-5 steps with a railing? 3 - A Little   Basic Mobility Raw Score (Score out of 24.Lower scores equate to lower levels of function) 18   Total Evaluation Time   Total Evaluation Time (Minutes) 10

## 2020-08-18 NOTE — PROGRESS NOTES
"[facility] Nurse Inpatient Wound Assessment   Reason for consultation: Evaluate and treat  Right Hip wounds    Assessment  Right Hip wounds due to Pressure Injury   Status: initial assessment  Pt was found down at home for unknown period of time. Ruptured blister with surrounding red erythema. Pt with significant bruising to RUE.   Treatment Plan  Right Hip wounds: Every other day   1. Clean wound with saline or MicroKlenz Spray, pat dry  2. Wipe / \"clean\" the surrounding periwound tissue with several skin preps to help with dressing sticking  3. Apply A piece of Adaptic (#243-floor stock) to wound bed  4. Cover with Mepilex  Border Dressing (#752754) to the area, making sure to conform nicely to skin curvatures.   5. Time and date dressing change  -REPOSITION ALL PATIENTS SIDE TO SIDE ONLY WHEN IN BED, EVERY 2 HOURS,   -HEELS MUST BE KEPT ELEVATED AT ALL TIMES, USING AT LEAST 2 PILLOWS UNDER EACH CALF, ASSURING HEELS ARE FLOATING  -WHEN UP TO THE CHAIR PT NEEDS TO FULLY OFF LOAD EVERY 2 HOURS      Pressure Injury Prevention (PIP) Plan:  If patient is declining pressure injury prevention interventions: Explore reason why and address patient's concerns, Educate on pressure injury risk and prevention intervention(s) and If patient is still declining, document \"informed refusal\"   Mattress: Follow bed algorithm, reassess daily and order specialty mattress, if indicated.  HOB: Maintain at or below 30 degrees, unless contraindicated  Repositioning in bed: Every 1-2 hours , Left/right positioning; avoid supine and Raise foot of bed prior to raising head of bed, to reduce patient sliding down (shear)  Heels: Keep elevated off mattress and Pillows under calves  Protective Dressing: None  Positioning Equipment: None  Chair positioning: Assist patient to reposition hourly   If patient has a buttock pressure injury, or high risk for PI use chair cushion or SPS.  Moisture Management: Perineal cleansing /protection: Follow Incontinence " Protocol, Avoid brief in bed and Clean and dry skin folds with bathing   Under Devices: Inspect skin under all medical devices during skin inspection , Ensure tubes are stabilized without tension and Ensure patient is not lying on medical devices or equipment when repositioned  Ask provider to discontinue device when no longer needed.        Orders Written  Recommended provider order: None, at this time  WOC Nurse follow-up plan:weekly  Nursing to notify the Provider(s) and re-consult the WOC Nurse if wound(s) deteriorates or new skin concern.    Patient History  According to provider note(s):  Belen Paredes is a 89 year old female who presents with fall, found to have rhabdomyolysis and abnormal UA    Objective Data  Containment of urine/stool: Incontinence Protocol    Active Diet Order  Orders Placed This Encounter      Combination Diet Regular Diet Adult      Output:   No intake/output data recorded.    Risk Assessment:   Sensory Perception: 3-->slightly limited  Moisture: 3-->occasionally moist  Activity: 2-->chairfast  Mobility: 2-->very limited  Nutrition: 2-->probably inadequate  Friction and Shear: 2-->potential problem  Mike Score: 14                          Labs:   Recent Labs   Lab 08/17/20  1858   ALBUMIN 3.5   HGB 11.6*   WBC 16.0*       Physical Exam  Areas of skin assessed: focused Sacrum, coccyx, BL hips.    Wound Location:  Right Hip        Date of last photo 8/18/20  Wound History: Pt found down at home for unknown period of time, ruptured blister    Wound Base: 100 % superficial, pink moist, dermis     Palpation of the wound bed: normal      Drainage: small     Description of drainage: serosanguinous     Measurements (length x width x depth, in cm) 6.5  x 5  x  0.1 cm      Tunneling N/A     Undermining N/A  Periwound skin: intact and erythema- blanchable, erythema extends approx 3-5cm around wound       Color: red      Temperature: normal   Odor: none  Pain: absent, none  Pain intervention  prior to dressing change: NA    Interventions  Visual inspection and assessment completed   Wound Care Rationale Promote moist wound healing without tissue dehydration  and Provide protection   Wound Care: done per plan of care  Supplies: at bedside  Current off-loading measures: Pillows under calves  Current support surface: Standard  Atmos Air mattress, pulsate ordered  Education provided to: importance of repositioning, plan of care, wound progress and Off-loading pressure  Discussed plan of care with Nurse    Demar Amaro RN  CWOCN

## 2020-08-18 NOTE — PLAN OF CARE
"OT: attempted to see patient for OT evaluation, patient asleep, when awoken patient appeared confused, inconsistently following commands. Patient resistive to therapy and refused to get out of bed or move this morning,\"I'm in a cold house, leave!\"  "

## 2020-08-18 NOTE — H&P
[facility]    History and Physical  Hospitalist       Date of Admission:  8/17/2020    Assessment & Plan   Belen Paredes is a 89 year old female who presents with fall, found to have rhabdomyolysis and abnormal UA    Rhabdomyolysis  Fall, unwitnessed  Prolonged time on the ground unable to get up. CK 3356. Cr 0.76. AST mild elevated at 103. Na 130.  - Admit inpatient  - IVF @100ml/hr--monitor respiratory status given hx chf  - CK in AM  - PT/OT/Care transitions consult--unlikely safe to go home  - Attempted phone call to son to collect additional information, but no answer and mail box not set-up yet.  -- son is primary contact listed on paper that came with her on transfer.    Hyponatremia  Na 130. Likely secondary to decreased intake since has been down on the ground  - IVF as above  - BMP in AM    Abnormal UA  UA with moderate leuk esterase, 7 WBCs. Empirically given ceftriaxone in ED.  - Follow up urine culture  - continue ceftriaxone for now    Leukocytosis  Could be secondary to stress demargination with prolonged time down. Given abnormal UA as above, was started on antibiotics  - CBC in AM    Anemia  Hx of. Hgb 11.6 on admit, no signs of bleeding. Hx hgb 7-9 range in 2014.  - CBC in AM    Hx Systolic CHF  TTE 9/2014 wth EF 30-35% with grade 1 diastolic dysfunction.  - Monitor fluid status carefully with ongoing IVF as above for rhabdo.  - daily weights  - I/Os    Right lateral hip pressure sore, present on admission  - WOC consulted    Hypertension  Known history high BP in the past, looks like she has been on lisinopril 5mg ~4 years ago, unsure if she is still on this medication. Likely that she missed doses if she was. BPs 170s-190s on admit  - await med rec  - hydralazine PRN for SBP>170    Hyperlipidemia  Has been on statin in the past  - Await med rec    Hard of hearing  Normally wears hearing aides, but does not have them  - Please have pocket talker available    Asymptomatic  COVID-19 screening  Found down at home, no suspicious symptoms, but unreliable.  - Low suspicion. Add precautions if positive    DVT Prophylaxis: Pneumatic Compression Devices  Code Status: Full Code, until can discuss with patient's son  Expected discharge: 2-3 days, recommended to likely TCU or some other environment with increased support pending therapy varun Amato DO    Primary Care Physician   [facility]    Chief Complaint   Fall, found down    History is obtained from the ED provider    History of Present Illness   Belen Paredes is a 89 year old female who presented with EMS after being found down by building maintenance (who were called when son could not reach her). She was found crawling around her apartment to try to call her son. She was covered in feces. Unknown time that she was unable to get up. She is unable to provide further history or tell me what happened. She is not clearly oriented, but states that her son always chides her when she doesn't know the date.  She does not remember falling. Denies diarrhea, fevers, chills, urinary pain, abdominal pain, chest pain, shortness of breath. She is happy to be in the hospital and states she thinks she needs some assistance.    Past Medical History    I have reviewed this patient's medical history and updated it with pertinent information if needed.   Hyperlipidemia  Chronic systolic heart failure  Anemia  Coronary atherosclerosis   Memory loss  NSTEMI  Hypertension  Congestive heart failure     Past Surgical History   I have reviewed this patient's surgical history and updated it with pertinent information if needed.  Total abdominal hysterectomy and bilateral salping-oophorectomy   Lobectomy of lung    Prior to Admission Medications   None   She might be on lisinopril 5mg, asa 81mg, statin and zantac--but this was from 2016 records. She is unable to provide history, son not available by phone.    Allergies   No Known  Allergies    Social History   I have reviewed this patient's social history and updated it with pertinent information if needed. She is unable to provide history regarding smoking/alcohol use.    Family History   I have reviewed this patient's family history and updated it with pertinent information if needed. Unable to obtain secondary to mental status    Review of Systems   The 10 point Review of Systems is negative other than noted in the HPI, limited due to confusion and hearing.    Physical Exam   Temp: 98.4  F (36.9  C) Temp src: Oral BP: (!) 180/77 Pulse: 84 Heart Rate: 85 Resp: 16 SpO2: 98 % O2 Device: None (Room air)    Vital Signs with Ranges  Temp:  [97.6  F (36.4  C)-98.4  F (36.9  C)] 98.4  F (36.9  C)  Pulse:  [67-84] 84  Heart Rate:  [67-86] 85  Resp:  [10-16] 16  BP: (164-199)/(64-85) 180/77  SpO2:  [94 %-98 %] 98 %  98 lbs 15.78 oz    Constitutional: Awake, alert, cooperative, no apparent distress.  Eyes: Conjunctiva and pupils examined and normal.  HEENT: Moist mucous membranes, normal dentition, hard of hearing  Respiratory: Clear to auscultation bilaterally, no crackles or wheezing.  Cardiovascular: Regular rate and rhythm, normal S1 and S2, and no murmur noted.  GI: Soft, non-distended, non-tender, normal bowel sounds.  Lymph/Hematologic: No anterior cervical or supraclavicular adenopathy.  Skin: No rashes, no cyanosis, no edema. Ecchymosis right arm, grade 2 pressure sore on right lateral hip.  Musculoskeletal: No joint swelling, erythema or tenderness.  Neurologic: moves all extremities, follows commands  Psychiatric: Alert, oriented to person, not to place, or time. no obvious anxiety or depression.    Data   Data reviewed today:  I personally reviewed CT moderate cerebral atrophy, moderately extensive white matter changes- likely chronic small vessel ischemic disease. CXR: degenerative changes of left shoulder--subcapital fracture not excluded. XR pelvis/hip: no fracture or dislocation, mild  degenerative changes both hips.  Recent Labs   Lab 08/17/20  1858   WBC 16.0*   HGB 11.6*   MCV 88      *   POTASSIUM 4.0   CHLORIDE 97   CO2 26   BUN 28   CR 0.76   ANIONGAP 7   JENNY 8.8   *   ALBUMIN 3.5   PROTTOTAL 7.1   BILITOTAL 1.0   ALKPHOS 97   ALT 39   *       Recent Results (from the past 24 hour(s))   Head CT w/o contrast    Narrative    CT SCAN OF THE HEAD WITHOUT CONTRAST   8/17/2020 7:19 PM     HISTORY: Altered mental status, unexplained. Fall.    TECHNIQUE:  Axial images of the head and coronal reformations without  IV contrast material.  Radiation dose for this scan was reduced using  automated exposure control, adjustment of the mA and/or kV according  to patient size, or iterative reconstruction technique.    COMPARISON: None.    FINDINGS: Moderate cerebral atrophy is present. Moderately extensive  white matter changes are seen in both hemispheres without mass effect.  There is no evidence for intracranial hemorrhage, mass effect, acute  infarct, or skull fracture. Visualized paranasal sinuses and mastoid  air cells are clear.      Impression    IMPRESSION:  1. Moderate cerebral atrophy.  2. Moderately extensive white matter changes which are most likely due  to chronic small vessel ischemic disease.  3. No evidence for intracranial hemorrhage or any acute process.      ALLEY RIBERA MD   XR Chest 1 View    Narrative    CHEST ONE VIEW  8/17/2020 7:35 PM     HISTORY: Fall    COMPARISON: None.      Impression    IMPRESSION: No airspace consolidation, pneumothorax, or pleural  effusion. The aorta is elongated and calcified. Severe degenerative  changes of the left shoulder. Left shoulder is not well profiled and a  subcapital fracture cannot be excluded. Consider dedicated shoulder  films if there is shoulder pain.    KEY HAWKINS MD   XR Pelvis w Hip Right 1 View    Narrative    EXAM: XR PELVIS AND HIP RIGHT 1 VIEW  LOCATION: [facility]  DATE/TIME: 8/17/2020  7:17 PM    INDICATION: Right hip pain. Possible fall.  COMPARISON: None.      Impression    IMPRESSION: No fracture or dislocation. Mild degenerative changes in both hips.

## 2020-08-19 NOTE — PROGRESS NOTES
[facility]    Hospitalist Progress Note    Date of Service (when I saw the patient): 08/19/2020    Assessment & Plan   Belen Paredes is a 89 year old female who was admitted on 8/17/2020.  Assessment & Plan     Belen Paredes is a 89 year old female who presents with fall, found to have rhabdomyolysis and abnormal UA     Rhabdomyolysis  Fall, unwitnessed  Prolonged time on the ground unable to get up. CK 3356. Cr 0.76. AST mild elevated at 103. Na 130.  - CPK improved with hydration to 1805 yesterday   Cr remains normal. She is having good urine out put.  urinary retention pedroza placed    Voiding trial today   D/horacio IVF today     - PT/OT/Care transitions consult--needs TCU on discharge     Hyponatremia  Na 130. Likely secondary to decreased intake since has been down on the ground  Sodium improved to 136 today with IV fluid hydration  D/horacio IVF today      Abnormal UA  UA with moderate leuk esterase, 7 WBCs. Empirically given ceftriaxone in ED.  - Follow up urine culture  Urine culture is negative so stopped IV ceftriaxone      Leukocytosis    Most likely due to dehydration WBC count is improving with hydration from 16 K yesterday to 12.6 today    Anemia  Hx of. Hgb 11.6 on admit, no signs of bleeding. Hx hgb 7-9 range in 2014.  Hemoglobin at 9.9 today continue to monitor closely     Hx Systolic CHF  TTE 9/2014 wth EF 30-35% with grade 1 diastolic dysfunction.  - Monitor fluid status carefully with ongoing IVF as above for rhabdo.  - daily weights  - I/Os  Will give 20mg lasix once today      Right lateral hip pressure sore, present on admission  - WOC consulted     Hypertension  Known history high BP in the past, looks like she has been on lisinopril 5mg ~4 years ago, unsure if she is still on this medication. Likely that she missed doses if she was. BPs 170s-190s on admit  - hydralazine PRN for SBP>170  Blood pressure in the 150s today     Hyperlipidemia  Has been on statin in the past  - Await  med rec     Hard of hearing  Normally wears hearing aides, but does not have them  - Please have pocket talker available     Asymptomatic COVID-19 screening ruled out   Found down at home, no suspicious symptoms       DVT Prophylaxis: Pneumatic Compression Devices  Code Status: DNR/DNI ordered after discussion with son     Disposition: Expected discharge tomorrow if TCU bed is available     Paige Torres MD  [phone] (P)      Interval History   Sitting comfortably in chair .  Denies any pain. No SOB or chest pain     -Data reviewed today: I reviewed all new labs and imaging results over the last 24 hours. I personally reviewed no images or EKG's today.    Physical Exam   Temp: 98.9  F (37.2  C) Temp src: Oral BP: (!) 150/59 Pulse: 76 RETIRE: Heart Rate: 85 Resp: 16 SpO2: 95 % O2 Device: None (Room air)    Vitals:    08/17/20 2218 08/18/20 0428 08/19/20 0618   Weight: 44.9 kg (98 lb 15.8 oz) 43.5 kg (95 lb 14.4 oz) 47 kg (103 lb 9.9 oz)     Vital Signs with Ranges  Temp:  [98  F (36.7  C)-98.9  F (37.2  C)] 98.9  F (37.2  C)  Pulse:  [76-84] 76  RETIRE: Heart Rate:  [85] 85  Resp:  [16] 16  BP: (126-150)/(46-64) 150/59  SpO2:  [95 %-98 %] 95 %  I/O last 3 completed shifts:  In: 2810 [P.O.:360; I.V.:2450]  Out: 925 [Urine:925]    Constitutional: Awake, alert, cooperative, no apparent distress, elderly female lying comfortably in bed and resting  Respiratory: Clear to auscultation bilaterally, no crackles or wheezing  Cardiovascular: Regular rate and rhythm, normal S1 and S2, and no murmur noted  GI: Normal bowel sounds, soft, non-distended, non-tender  Skin/Integumen: No rashes, no cyanosis, no edema  Other:     Medications       sodium chloride (PF)  3 mL Intracatheter Q8H       Data   Recent Labs   Lab 08/19/20  1015 08/18/20  0906 08/17/20  1858   WBC 12.5* 12.6* 16.0*   HGB 10.3* 9.9* 11.6*   MCV 90 87 88    272 348    134 130*   POTASSIUM 3.4 3.9 4.0   CHLORIDE 108 105 97   CO2 22 24 26   BUN 28  29 28   CR 0.81 0.68 0.76   ANIONGAP 6 5 7   JENNY 7.9* 7.5* 8.8   * 88 139*   ALBUMIN  --  2.6* 3.5   PROTTOTAL  --  5.5* 7.1   BILITOTAL  --  0.6 1.0   ALKPHOS  --  65 97   ALT  --  34 39   AST  --  78* 103*       No results found for this or any previous visit (from the past 24 hour(s)).

## 2020-08-19 NOTE — PLAN OF CARE
At baseline pt lives alone in a condo. Is independent in self-cares, laundry, light cleaning, cooking. Son assists with financial management, transportation, shopping. Pt reports she takes no meds.    Discharge Planner OT   Patient plan for discharge: TCU  Current status: Upon arrival pt ambulating in [place] with 4WW and staff member. Pt ambulated additional 300 ft with 4WW with CGA for balance safety. Pt stood at hospital room window to look out. She attempted to ambulate without walker and needed max cues and assist. Pt cued to sit in recliner and instead sat on 4WW seat; needed max cues and assist to lock brakes for safety. Impaired 2 step direction following throughout session. Pt had already completed grooming and dressing tasks. Pt with pedroza catheter. Son arrived. Pt and son participated in education regarding recommendation for TCU.   Barriers to return to prior living situation: fall risk; lives alone; cognitive impairment; impulsive; level of assist for ADLs and IADLs  Recommendations for discharge: TCU  Rationale for recommendations: Pt would be unsafe to return home at this time due to the above mentioned barriers. Pt would benefit from continued therapies at TCU setting to maximize independence, activity tolerance and safety with ADL, IADL, and mobility.         Entered by: Adriane Holley 08/19/2020 10:24 AM

## 2020-08-19 NOTE — CONSULTS
CLINICAL NUTRITION SERVICES  -  ASSESSMENT NOTE    Recommendations Ordered by Registered Dietitian (RD):   Regular diet per MD  Add vanilla milkshake + 2 pkts benepro BID between meals.    Malnutrition:   % Weight Loss:  No weight loss indicated - low weight at baseline  % Intake:  No decreased intake noted  Subcutaneous Fat Loss:  Consistent with age  Muscle Loss:  Consistent with age  Fluid Retention:  None noted    Malnutrition Diagnosis: Patient does not meet two of the above criteria necessary for diagnosing malnutrition     REASON FOR ASSESSMENT  Belen Paredes is a 89 year old female seen by Registered Dietitian for Admission Nutrition Risk Screen for stageable pressure injuries or large/non-healing wound or burn (new)     NUTRITION HISTORY  - Information obtained from patient and chart review. Patient's son walked in during visit as well.   - Patient is admitted after an unwitnessed fall, resulting in rhabdomyolysis after lying on the floor for several hours.  - PMH includes CHF, HTN, hyperlipidemia. She is also very [place]  (hearing aids).    - Pt comes from home alone, where her son will often stop over with groceries and for a meal.   - She eats really small meals, but states that her appetite is good. Per her son, she eats well but only small amounts at a time.   - She used to do Boost/Ensure and TV dinners but she was not really fond of either of these things.   - She likes eggs, bread, yogurt, ice cream.   - NKFA      CURRENT NUTRITION ORDERS  Diet Order:     Regular     Current Intake/Tolerance:  Belen states that her appetite is great, and she looks forward to having a baked potato for lunch. During visit she has an HB egg and fruit on tray table, states she is still planning to eat the egg. Agreed to ice cream w/ her lunch and to vanilla milkshakes BID between meals.     NUTRITION FOCUSED PHYSICAL ASSESSMENT FOR DIAGNOSING MALNUTRITION)  Yes           Observed:    Some increased muscle losses  "suspected with rhabodo, though pt otherwise appears appropriate for age    Obtained from Chart/Interdisciplinary Team:  Last BM 8/18  Mike - Nutrition 3; Total 17    WOCN assessment 8/18 - ruptured blister w/ surrounding red erythema.     ANTHROPOMETRICS  Height: 5' 2.4\"  Weight: 43.5 kg - lowest of admission   Body mass index is 17.3 kg/m .  Weight Status:  Underweight BMI <18.5  IBW: 51.1 kg  % IBW: 85%  Weight History:   Wt Readings from Last 10 Encounters:   08/19/20 47 kg (103 lb 9.9 oz)       LABS  Labs reviewed   (H)    MEDICATIONS  Medications reviewed  Lasix     ASSESSED NUTRITION NEEDS PER APPROVED PRACTICE GUIDELINES:  Dosing Weight 43.5 kg - lowest of admission   Estimated Energy Needs: 8190-9289 kcals (25-30 Kcal/Kg)  Justification: maintenance  Estimated Protein Needs: 52-65 grams protein (1.2-1.5 g pro/Kg)  Justification: wound healing and preservation of lean body mass  Estimated Fluid Needs: >1 mL/kcal   Justification: maintenance    MALNUTRITION:  % Weight Loss:  No weight loss indicated - low weight at baseline  % Intake:  No decreased intake noted  Subcutaneous Fat Loss:  Consistent with age  Muscle Loss:  Consistent with age  Fluid Retention:  None noted    Malnutrition Diagnosis: Patient does not meet two of the above criteria necessary for diagnosing malnutrition    NUTRITION DIAGNOSIS:  Predicted inadequate nutrient intake (kcal/protein) related to decreased appetite, reduced ability to eat adequate quantities at mealtimes, tendency to consume small meals.      NUTRITION INTERVENTIONS  Recommendations / Nutrition Prescription  Regular diet per MD  Add vanilla milkshake + 2 pkts benepro BID between meals.       Implementation  Nutrition education: Provided education on supplements available.   Medical Food Supplement: as above      Nutrition Goals  Intake of at least 75% meals TID + 1-2 supplements.       MONITORING AND EVALUATION:  Progress towards goals will be monitored and " evaluated per protocol and Practice Guidelines    Mariah Estrada RD, LD  Pager: [phone]

## 2020-08-19 NOTE — PLAN OF CARE
DATE & TIME: 8/18/2020; 1795-3995  Cognitive Concerns/ Orientation : A & O  x 1 (self only)   BEHAVIOR & AGGRESSION TOOL COLOR: Green   CIWA SCORE: N/A   ABNL VS/O2: VSS on RA   MOBILITY: x 2 Gb/walker  PAIN MANAGMENT: denies  DIET: Regular; fair appetite  BOWEL/BLADDER: BS active x 4; incontinent of bowel; pedroza in place  ABNL LAB/BG: Creatinine= 1,805; Albumin= 2.6; AST=78; WBC=12.6; Hgb=9.9   DRAIN/DEVICES: PIV infusing NS @ 75 mL/hr   TELEMETRY RHYTHM: N/A  SKIN: Scattered bruising; abrasion on left posterior head; R. Hip wound (foam/CDI)   TESTS/PROCEDURES: N/A  D/C DAY/GOALS/PLACE: pending discharge to TCU   OTHER IMPORTANT INFO: Sitter at the bedside     MD/RN ROUNDING SIGNED OFF D/E SHIFT: N/A  COMMIT TO SIT DONE AND SIGNED OFF COMPLETE

## 2020-08-19 NOTE — PROGRESS NOTES
DATE & TIME: 08/18/2020 Night    Cognitive Concerns/ Orientation : Alert to self only. Pleasant and cheerful   BEHAVIOR & AGGRESSION TOOL COLOR: green  CIWA SCORE: n/a   ABNL VS/O2: /64, otherwise VSS, room air  MOBILITY: Assist-2 gait belt, walker  PAIN MANAGMENT: Denies  DIET: Regular  BOWEL/BLADDER: Burk, incontinent of bowel  ABNL LAB/BG: AST 78, Creat Kinase 1805, WBC 12.6, Hgb 9.9, Hematocrit 28.8  DRAIN/DEVICES: L PIV infusing Normal Saline at 75 mL/hour  TELEMETRY RHYTHM: n/a  SKIN: Bruising, abrasion on L posterior head-scant drainage, R hip wound-foam dressing-clean, dry, intact  TESTS/PROCEDURES: CT of pelvis negative for fracture, Severe degeneration of L shoulder, CT of head negative for acute injury  D/C DAY/GOALS/PLACE: Pending, to TCU  OTHER IMPORTANT INFO: Sitter at bedside  MD/RN ROUNDING SIGNED OFF D/E SHIFT: n/a  COMMIT TO SIT DONE AND SIGNED OFF YES

## 2020-08-19 NOTE — CONSULTS
Both Care transitions and  orders placed.  Therapies are recommending a TCU.  Have conversed with rounding Hospitalist.  It is anticipated that pt will be ready for hospital discharge tomorrow.  Pt currently has a sitter.  She will need to be without a sitter for 24hrs in order to transition to a TCU.  SW is aware and will be working with pt/family on discharge planning.    Addendum: Conversed with pt's nurse.  Pt has improved.  Sitter was discontinued at 9:30 this AM.

## 2020-08-19 NOTE — PROGRESS NOTES
08/19/20 1030   Quick Adds   Type of Visit Initial Occupational Therapy Evaluation   Living Environment   Lives With alone   Living Arrangements condominium   Home Accessibility no concerns   Transportation Anticipated family or friend will provide   Living Environment Comment Son brings groceries. Son reports he checks in daily with phone call.   Self-Care   Usual Activity Tolerance good   Current Activity Tolerance moderate   Equipment Currently Used at Home walker, rolling   Activity/Exercise/Self-Care Comment Per son, pt uses 4WW for longer distances. Nothing in condo.   Functional Level   Ambulation 1-->assistive equipment  (for community mobility)   Transferring 0-->independent   Toileting 0-->independent   Bathing 0-->independent   Dressing 0-->independent   Eating 0-->independent   Fall history within last six months yes   Number of times patient has fallen within last six months 1   General Information   Onset of Illness/Injury or Date of Surgery - Date 08/17/20   Referring Physician Racheal Amato, DO   Patient/Family Goals Statement Improve independence   Additional Occupational Profile Info/Pertinent History of Current Problem 89 yo female found down in her condo. Onset of rhabdomyolitis and hyponatremia. Pt [place] at baseline and has HTN.   Precautions/Limitations fall precautions   Cognitive Status Examination   Orientation person;place;time   Level of Consciousness alert   Follows Commands (Cognition) follows two step commands;25-49% accuracy;increased processing time needed;repetition of directions required;verbal cues/prompting required   Memory impaired   Attention Selective attention impaired, difficulty ignoring irrelevant stimuli;Alternating attention impaired, difficulty shifting between tasks   Organization/Problem Solving Problem solving impaired   Executive Function Self awareness/monitoring impaired;Planning ability impaired   Visual Perception   Visual Perception   (Pt denies  "vision impairment)   Pain Assessment   Patient Currently in Pain Yes, see Vital Sign flowsheet   Strength   Strength Comments Generalized weakness throughout BUEs; reduced gross grasp strength bilaterally   Transfer Skill: Sit to Stand   Level of [place]: Sit/Stand contact guard   Transfer Skill: Toilet Transfer   Level of [place]: Toilet contact guard   Lower Body Dressing   Level of [place]: Dress Lower Body minimum assist (75% patients effort)   Grooming   Level of [place]: Grooming stand-by assist   Instrumental Activities of Daily Living (IADL)   Previous Responsibilities meal prep;housekeeping;laundry   Activities of Daily Living Analysis   Impairments Contributing to Impaired Activities of Daily Living balance impaired;cognition impaired;coordination impaired;pain;strength decreased   General Therapy Interventions   Planned Therapy Interventions ADL retraining;cognition;progressive activity/exercise   Clinical Impression   Criteria for Skilled Therapeutic Interventions Met yes, treatment indicated   OT Diagnosis decline function   Influenced by the following impairments balance impaired;cognition impaired;coordination impaired;pain;strength decreased   Assessment of Occupational Performance 5 or more Performance Deficits   Identified Performance Deficits dressing, grooming, toileting, bathing, cooking, cleaning   Clinical Decision Making (Complexity) Low complexity   Therapy Frequency 5x/week   Predicted Duration of Therapy Intervention (days/wks) 3 days   Anticipated Equipment Needs at Discharge other (see comments)  (TBD)   Anticipated Discharge Disposition Transitional Care Facility   Risks and Benefits of Treatment have been explained. Yes   Patient, Family & other staff in agreement with plan of care Yes   Clinical Impression Comments Pt functioning below baseline and will benefit from continued skilled OT to maximize safety and independence   [facility] AM-PAC TM \"6 Clicks\"   " "2016, Trustees of [facility], under license to [employer].  All rights reserved.   6 Clicks Short Forms Daily Activity Inpatient Short Form   [facility] AM-PAC  \"6 Clicks\" Daily Activity Inpatient Short Form   1. Putting on and taking off regular lower body clothing? 3 - A Little   2. Bathing (including washing, rinsing, drying)? 3 - A Little   3. Toileting, which includes using toilet, bedpan or urinal? 3 - A Little   4. Putting on and taking off regular upper body clothing? 3 - A Little   5. Taking care of personal grooming such as brushing teeth? 3 - A Little   6. Eating meals? 4 - None   Daily Activity Raw Score (Score out of 24.Lower scores equate to lower levels of function) 19   Total Evaluation Time   Total Evaluation Time (Minutes) 9     "

## 2020-08-19 NOTE — PLAN OF CARE
"DATE & TIME: 08/19/2020 Day     Cognitive Concerns/ Orientation : Alert to self and time, pleasant and coop. Reports feeling \"alive and well\". [place], ulices HA in place.   BEHAVIOR & AGGRESSION TOOL COLOR: Green. Sitter removed at 0930.  CIWA SCORE: n/a   ABNL VS/O2: VSS on RA  MOBILITY: Up with assist of 1 with gait belt/walker, chair for meals, amb in [place].   PAIN MANAGMENT: Denies  DIET: Regular  BOWEL/BLADDER: Burk, patent/removed at 3pm. Pt reports urine incontinence at baseline. Previously reported to be inc of bowel, no BM this shift.  ABNL LAB/BG: COVID neg. WBC 12.5  DRAIN/DEVICES: L PIV SL  TELEMETRY RHYTHM: n/a  SKIN: Bruising, abrasion on L posterior head/scalp CYNTHIA, R hip wound-foam drsg changed during bath. [place] UE skin protectors on.  TESTS/PROCEDURES: none this shift  D/C DAY/GOALS/PLACE: hopeful TCU tomorrow, son to assist with discharge planning.  OTHER IMPORTANT INFO: COVID negative. PT/OT/SW following. Lasix 20mg x1 given, urine 350cc out.   "

## 2020-08-20 NOTE — PLAN OF CARE
DATE & TIME: 08/20/2020 Day    Cognitive Concerns/ Orientation : A&Ox self and place, reports feeling like she is in a nightmare and can't recall prior events. Coop however, states feeling frustrated and paranoid, d/t interrupted sleep?, MD aware.  BEHAVIOR & AGGRESSION TOOL COLOR: Green  CIWA SCORE: n/a   ABNL VS/O2: VSS on RA  MOBILITY: Assist-1 GB/walker, chair most of shift and amb in [place].  PAIN MANAGMENT: Denies.  DIET: Regular, good appetite.  BOWEL/BLADDER: Up to bathroom, incontinent at baseline but dry brief. Needed much enc to void, able to void x1.  ABNL LAB/BG:   DRAIN/DEVICES: L PIV saline locked  TELEMETRY RHYTHM: n/a  SKIN: Foam on R hip and coccyx; Head wound open to air. B UE skin protector sleeves on, bruising present.  TESTS/PROCEDURES: n/a  D/C DAY/GOALS/PLACE: To TCU at 3pm with sonGuido. Pt aware however occ forgetful.  Other: All belongings were sent with pt including jalen h/a and charging case. Son, reviewed discharge instructions.

## 2020-08-20 NOTE — DISCHARGE SUMMARY
[facility]  Discharge Summary        Belen Paredes MRN# [id number]   YOB: 1922 Age: 89 year old     Date of Admission:  8/17/2020  Date of Discharge:  8/20/2020  Admitting Physician:  Racheal Amato DO  Discharge Physician: Paige Torres MD  Discharging Service: Hospitalist     Primary Provider: [facility]  Primary Care Physician Phone Number: [phone]         Discharge Diagnoses/Problem Oriented Hospital Course (Providers):    Belen Paredes was admitted on 8/17/2020 by Racheal Amato DO and I would refer you to their history and physical.  The following problems were addressed during her hospitalization:  Assessment & Plan     Belen Paredes is a 89 year old female who was admitted on 8/17/2020.  Assessment & Plan     Belen Paredes is a 89 year old female who presents with fall, found to have rhabdomyolysis and abnormal UA     Rhabdomyolysis  Fall, unwitnessed  Prolonged time on the ground unable to get up. CK 3356. Cr 0.76. AST mild elevated at 103. Na 130.  - CPK improved with hydration to 1805 yesterday   Cr remains normal. She is having good urine out put.  D/horacio IVF    Urinary retention improved. Voiding well      - PT/OT/Care transitions consult--needs TCU on discharge      Hyponatremia  Na 130. Likely secondary to decreased intake since has been down on the ground  Sodium improved to 136 today with IV fluid hydration  D/horacio IVF today      Abnormal UA  UA with moderate leuk esterase, 7 WBCs. Empirically given ceftriaxone in ED.  - Follow up urine culture  Urine culture is negative so stopped IV ceftriaxone      Leukocytosis     Most likely due to dehydration WBC count is improving with hydration from 16 K yesterday to 12.6 today     Anemia  Hx of. Hgb 11.6 on admit, no signs of bleeding. Hx hgb 7-9 range in 2014.  Hemoglobin at 9.9 today continue to monitor closely     Hx Systolic CHF  TTE 9/2014 wth EF 30-35% with grade 1  diastolic dysfunction.  - Monitor fluid status carefully with ongoing IVF as above for rhabdo.  - daily weights  - I/Os  REstarted PTA meds      Right lateral hip pressure sore, present on admission  - WOC consulted     Hypertension  Known history high BP in the past, looks like she has been on lisinopril 2.5 mg and metoprolol 25mg PO BID    Hyperlipidemia  Restarted statin   Hard of hearing  Normally wears hearing aides, but does not have them  - Please have pocket talker available     Asymptomatic COVID-19 screening ruled out   Found down at home, no suspicious symptoms        DVT Prophylaxis: Pneumatic Compression Devices  Code Status: DNR/DNI ordered after discussion with son      Disposition: TCU today      Paige Torres MD  [phone] (P)            Code Status:      DNR / DNI         Important Results:      See below          Pending Results:        Unresulted Labs Ordered in the Past 30 Days of this Admission     No orders found from 7/18/2020 to 8/18/2020.               Discharge Instructions and Follow-Up:      Follow-up Appointments     Follow Up and recommended labs and tests      Follow up with Nursing home physician.  No follow up labs or test are   needed.                  Discharge Disposition:      Discharged to short-term care facility         Discharge Medications:        Current Discharge Medication List      START taking these medications    Details   aspirin (ASA) 81 MG EC tablet Take 1 tablet (81 mg) by mouth daily  Qty: 30 tablet, Refills: 0    Associated Diagnoses: Coronary artery disease involving native coronary artery of native heart, angina presence unspecified      atorvastatin (LIPITOR) 20 MG tablet Take 1 tablet (20 mg) by mouth every evening  Qty: 20 tablet, Refills: 0    Associated Diagnoses: Coronary artery disease involving native coronary artery of native heart, angina presence unspecified      ferrous sulfate (FEROSUL) 325 (65 Fe) MG tablet Take 1 tablet (325 mg) by mouth 2  "times daily (with meals)  Qty: 60 tablet, Refills: 0    Associated Diagnoses: Coronary artery disease involving native coronary artery of native heart, angina presence unspecified      lisinopril (ZESTRIL) 2.5 MG tablet Take 1 tablet (2.5 mg) by mouth daily  Qty: 30 tablet, Refills: 0    Associated Diagnoses: Coronary artery disease involving native coronary artery of native heart, angina presence unspecified      metoprolol tartrate (LOPRESSOR) 25 MG tablet Take 1 tablet (25 mg) by mouth 2 times daily  Qty: 60 tablet, Refills: 0    Associated Diagnoses: Coronary artery disease involving native coronary artery of native heart, angina presence unspecified      nitroGLYcerin (NITROSTAT) 0.4 MG sublingual tablet For chest pain place 1 tablet under the tongue every 5 minutes for 3 doses. If symptoms persist 5 minutes after 1st dose call 911.  Qty: 20 tablet, Refills: 0    Associated Diagnoses: Coronary artery disease involving native coronary artery of native heart, angina presence unspecified                  Allergies:       No Known Allergies         Consultations This Hospital Stay:      No consultations were requested during this admission         Condition and Physical Exam on Discharge:      Discharge condition: Stable   Discharge vitals: Blood pressure (!) 142/59, pulse 78, temperature 97.5  F (36.4  C), temperature source Oral, resp. rate 16, height 1.585 m (5' 2.4\"), weight 45.4 kg (100 lb 1.4 oz), SpO2 94 %.     Constitutional: Alert and awake, NAD    Lungs: CTA   Cardiovascular: RRR, 2+ murmur   Abdomen: Soft, NT, ND, BS+   Skin: Warm and dry    Other:            Discharge Orders for Skilled Facility (from Discharge Orders):        After Care Instructions     Activity - Up with nursing assistance      Advance Diet as Tolerated      Follow this diet upon discharge:      Snacks/Supplements Adult: Other; vanilla shake +2 pkts benepro; Between Meals      Combination Diet Regular Diet Adult         General info " for SNF      Length of Stay Estimate: Short Term Care: Estimated # of Days 31-90  Condition at Discharge: Stable  Level of care:skilled   Rehabilitation Potential: Good  Admission H&P remains valid and up-to-date: Yes  Recent Chemotherapy: N/A  Use Nursing Home Standing Orders: Yes         Mantoux instructions      Give two-step Mantoux (PPD) Per Facility Policy Yes                    Rehab orders for Skilled Facility (from Discharge Orders):      Referrals     Future Labs/Procedures    Occupational Therapy Adult Consult     Comments:    Evaluate and treat as clinically indicated.    Reason:  fall    Physical Therapy Adult Consult     Comments:    Evaluate and treat as clinically indicated.    Reason:  Fall             Discharge Time:      Greater than 30 minutes.        Image Results From This Hospital Stay (For Non-EPIC Providers):        Results for orders placed or performed during the hospital encounter of 08/17/20   Head CT w/o contrast    Narrative    CT SCAN OF THE HEAD WITHOUT CONTRAST   8/17/2020 7:19 PM     HISTORY: Altered mental status, unexplained. Fall.    TECHNIQUE:  Axial images of the head and coronal reformations without  IV contrast material.  Radiation dose for this scan was reduced using  automated exposure control, adjustment of the mA and/or kV according  to patient size, or iterative reconstruction technique.    COMPARISON: None.    FINDINGS: Moderate cerebral atrophy is present. Moderately extensive  white matter changes are seen in both hemispheres without mass effect.  There is no evidence for intracranial hemorrhage, mass effect, acute  infarct, or skull fracture. Visualized paranasal sinuses and mastoid  air cells are clear.      Impression    IMPRESSION:  1. Moderate cerebral atrophy.  2. Moderately extensive white matter changes which are most likely due  to chronic small vessel ischemic disease.  3. No evidence for intracranial hemorrhage or any acute process.      ALLEY RIBERA MD    XR Chest 1 View    Narrative    CHEST ONE VIEW  8/17/2020 7:35 PM     HISTORY: Fall    COMPARISON: None.      Impression    IMPRESSION: No airspace consolidation, pneumothorax, or pleural  effusion. The aorta is elongated and calcified. Severe degenerative  changes of the left shoulder. Left shoulder is not well profiled and a  subcapital fracture cannot be excluded. Consider dedicated shoulder  films if there is shoulder pain.    KEY HAWKINS MD   XR Pelvis w Hip Right 1 View    Narrative    EXAM: XR PELVIS AND HIP RIGHT 1 VIEW  LOCATION: [facility]  DATE/TIME: 8/17/2020 7:17 PM    INDICATION: Right hip pain. Possible fall.  COMPARISON: None.      Impression    IMPRESSION: No fracture or dislocation. Mild degenerative changes in both hips.           Most Recent Lab Results In EPIC (For Non-EPIC Providers):    Most Recent 3 CBC's:  Recent Labs   Lab Test 08/19/20  1015 08/18/20  0906 08/17/20  1858   WBC 12.5* 12.6* 16.0*   HGB 10.3* 9.9* 11.6*   MCV 90 87 88    272 348      Most Recent 3 BMP's:  Recent Labs   Lab Test 08/19/20  1015 08/18/20  0906 08/17/20  1858    134 130*   POTASSIUM 3.4 3.9 4.0   CHLORIDE 108 105 97   CO2 22 24 26   BUN 28 29 28   CR 0.81 0.68 0.76   ANIONGAP 6 5 7   JENNY 7.9* 7.5* 8.8   * 88 139*     Most Recent 3 Troponin's:No lab results found.    Invalid input(s): TROP, TROPONINIES  Most Recent 3 INR's:No lab results found.  Most Recent 2 LFT's:  Recent Labs   Lab Test 08/18/20  0906 08/17/20  1858   AST 78* 103*   ALT 34 39   ALKPHOS 65 97   BILITOTAL 0.6 1.0     Most Recent Cholesterol Panel:No lab results found.  Most Recent 6 Bacteria Isolates From Any Culture (See EPIC Reports for Culture Details):  Recent Labs   Lab Test 08/17/20 1942   CULT No growth     Most Recent TSH, T4 and HgbA1c:No lab results found.

## 2020-08-20 NOTE — PROGRESS NOTES
DATE & TIME: 08/19/2020 Night    Cognitive Concerns/ Orientation : Disoriented to situation and time. Calm   BEHAVIOR & AGGRESSION TOOL COLOR: Green  CIWA SCORE: n/a   ABNL VS/O2: /69-PRN hydralazine given. Recheck 140/50, room air  MOBILITY: Assist-1 gait belt, walker  PAIN MANAGMENT: Denies this shift  DIET: Regular  BOWEL/BLADDER: Up to bathroom, incontinent at times  ABNL LAB/BG: WBC 12.5, Hgb 10.3, Hematocrit 31.6  DRAIN/DEVICES: L PIV saline locked  TELEMETRY RHYTHM: n/a  SKIN: Foam on R hip changed ; Head wound open to air. BUE sleeves on arms  TESTS/PROCEDURES: n/a  D/C DAY/GOALS/PLACE: [facility]ending [facility] at [facility]  OTHER IMPORTANT INFO: SW/PT/OT/WOC following  MD/RN ROUNDING SIGNED OFF D/E SHIFT: n/a  COMMIT TO SIT DONE AND SIGNED OFF: YES

## 2020-08-20 NOTE — PLAN OF CARE
"PT-  Pt refused PT at time of appt.  Stated \"I have no ambition to walk, I'm too tired and I'm just going to rest.\"  Unable to encourage participation.        Pt discharged to TCU today.  PT goals not met.     Physical Therapy Discharge Summary    Reason for therapy discharge:    Discharged to transitional care facility.    Progress towards therapy goal(s). See goals on Care Plan in [facility] electronic health record for goal details.  Goals not met.  Barriers to achieving goals:   discharge from facility.    Therapy recommendation(s):    Continued therapy is recommended.  Rationale/Recommendations:  Patient will benefit from continued skilled therapies in TCU to progress toward PLOF & maximize functional mobility and independence.      "

## 2020-08-20 NOTE — PLAN OF CARE
DATE & TIME: 8/19/2020; [id number]   Cognitive Concerns/ Orientation :  A & O x 2 (disoriented to situation and time); calm and cooperative; easily re-directable  BEHAVIOR & AGGRESSION TOOL COLOR: Green  CIWA SCORE: N/A   ABNL VS/O2: VSS on RA   MOBILITY: x 2 Gb/walker   PAIN MANAGMENT: denies   DIET: Regular; fair appetite  BOWEL/BLADDER: BS active x 4; incontinent of B&B at times   ABNL LAB/BG: WBC= 12.5; Hgb= 10.3   DRAIN/DEVICES: PIV/SL   TELEMETRY RHYTHM: N/A  SKIN: Scattered bruising to UE bilaterally; L. Posterior head abrasion (open to air); R. Hip open wound (Dressing CDI)  TESTS/PROCEDURES: N/A  D/C DAY/GOALS/PLACE: pending discharge   OTHER IMPORTANT INFO: long-arm present    MD/RN ROUNDING SIGNED OFF D/E SHIFT: N/A  COMMIT TO SIT DONE AND SIGNED OFF: COMPLETE

## 2020-08-20 NOTE — CONSULTS
Care Transition Initial Assessment -      Met with: spoke with son Guido at [phone]  Active Problems:    Rhabdomyolysis       DATA  Lives With: alone   Living Arrangements: condominium  Per son, she has lived independently.  Son calls patient daily and does her grocery shopping.  Insurance [employer].  HCD:  None listed.  Emergency contacts are son Guido and grandjinny Montoya.     Identified issues/concerns regarding health management: Patient admitted on 8/17, per H&P,  with diagnosis of rhabdomkyolysis, hyponatremia, abnormal UA and leukocytosis.  Patient had fallen at home and when son could not reach patient on the phone he called the building maintenance to check on patient.  TCU is recommended. Per Osmar Torres, patient is stable for discharge today (8/20)    ASSESSMENT  Cognitive Status:  Per RN notes, disoriented to situation and at times to place and time.  Son visited on 8/19 and felt patient was doing better than when first admitted.  Concerns to be addressed: TCU arrangements.  Due to patient's disorientation, writer has contacted darian Lora.  Explained writer's role.  Explained MD plans to discharge today and reviewed the TCU recommendation.  Son was aware of TCU recommendation and in agreement.   He lives in [place] and is interested [facility]  He also explains patient will not be returning to live in her condominium.  He has a call out to an halfway in the [place] area which has a possible apartment available.  He is waiting a call back today from the PO.   Education was given to son that star ratings are updated/maintained by Medicare and can be reviewed by visiting www.medicare.gov yes  Reviewed the visiting restrictions with son and the need to pack clothing for patient to wear during her TCU stay.    Call placed to [facility] admissions, spoke with Hafsa.  She has vacancies for today.  Referral faxed thru DOD process.  PLAN  Will wait to  hear back from [facility] and make additional referrals as a back up plan.  Financial costs for the patient includes transportation  Patient given options and choices for discharge son yes  Patient/family is agreeable to the plan?  yes  Transportation to be determined.  Patient Goals and Preferences: Son prefers TCU at [facility]  Patient anticipates discharging to:  Son anticipates to TCU

## 2020-08-20 NOTE — PROGRESS NOTES
SW:  Patient accepted into a private room at [facility] in [place]. The TCU does charge $50.00 per day for the private room above insurance reinbursement. Son notified of this   Son will transport by car.  shared with Hafsa in admissions that  is not aware of any TCU's now charging extra for there private when they are requiring patient be roomed by herself due to COVID restrictions and also shared this with the son.  Hafsa will review this policy with her  however son is accepting of the additional fee charged for the private room.    Writer did offer to look at other TCU's however he does prefer to have his mother go to [facility].      Orders have been faxed to the TCU, no hard scripts needed.   SNF authorization received thru [employer] by  Mulu.  Authorization number [id number] effective 8/20/20 to 8/29/20.  If patient doesn't enter the TCU by 8-26 a new authorization would need to be obtained.  The letter has been faxed to Hafsa at [facility] admission office.  The letter has been placed into patient's paper chart.  Son given resource of [employer] web site to locate [facility].    The TCU is requesting admission around 1530 to 1600.   Son will be here at 1500.  Bedside nurse aware.  PA approved.  Effective date: 8/20/20  PA reference #: [id number]    Pt. notified:  Son was.

## 2020-08-20 NOTE — DISCHARGE INSTRUCTIONS
"Discharging to   [facility]   [id number] [place], [place]    [phone]         Wound care  EVERY OTHER DAY      Comments: Right Hip wounds: Every other day (Even Days)   1. Clean wound with saline or MicroKlenz Spray, pat dry   2. Wipe / \"clean\" the surrounding periwound tissue with several skin preps to help with dressing sticking   3. Apply A piece of Adaptic (#243-floor stock) to wound bed   4. Cover with Mepilex  Border Dressing (#314257) to the area, making sure to conform nicely to skin curvatures.   5. Time and date dressing change           "

## 2021-01-01 ENCOUNTER — RECORDS - HEALTHEAST (OUTPATIENT)
Dept: LAB | Facility: CLINIC | Age: 86
End: 2021-01-01

## 2021-01-01 LAB
ANION GAP SERPL CALCULATED.3IONS-SCNC: 5 MMOL/L (ref 5–18)
BUN SERPL-MCNC: 28 MG/DL (ref 8–28)
CALCIUM SERPL-MCNC: 8.6 MG/DL (ref 8.5–10.5)
CHLORIDE BLD-SCNC: 104 MMOL/L (ref 98–107)
CO2 SERPL-SCNC: 29 MMOL/L (ref 22–31)
CREAT SERPL-MCNC: 1.08 MG/DL (ref 0.6–1.1)
GFR SERPL CREATININE-BSD FRML MDRD: 47 ML/MIN/1.73M2
GLUCOSE BLD-MCNC: 99 MG/DL (ref 70–125)
POTASSIUM BLD-SCNC: 4.3 MMOL/L (ref 3.5–5)
SARS-COV-2 PCR COMMENT: NORMAL
SARS-COV-2 RNA SPEC QL NAA+PROBE: NEGATIVE
SARS-COV-2 VIRUS SPECIMEN SOURCE: NORMAL
SODIUM SERPL-SCNC: 138 MMOL/L (ref 136–145)